# Patient Record
Sex: MALE | Race: OTHER | HISPANIC OR LATINO | URBAN - METROPOLITAN AREA
[De-identification: names, ages, dates, MRNs, and addresses within clinical notes are randomized per-mention and may not be internally consistent; named-entity substitution may affect disease eponyms.]

---

## 2017-10-19 ENCOUNTER — OUTPATIENT (OUTPATIENT)
Dept: OUTPATIENT SERVICES | Facility: HOSPITAL | Age: 27
LOS: 1 days | End: 2017-10-19
Payer: SELF-PAY

## 2017-10-19 ENCOUNTER — APPOINTMENT (OUTPATIENT)
Dept: FAMILY MEDICINE | Facility: HOSPITAL | Age: 27
End: 2017-10-19

## 2017-10-19 VITALS
RESPIRATION RATE: 14 BRPM | SYSTOLIC BLOOD PRESSURE: 120 MMHG | HEIGHT: 70 IN | DIASTOLIC BLOOD PRESSURE: 78 MMHG | BODY MASS INDEX: 31.21 KG/M2 | TEMPERATURE: 98 F | OXYGEN SATURATION: 99 % | HEART RATE: 65 BPM | WEIGHT: 218 LBS

## 2017-10-19 DIAGNOSIS — L21.8 OTHER SEBORRHEIC DERMATITIS: ICD-10-CM

## 2017-10-19 PROCEDURE — 80053 COMPREHEN METABOLIC PANEL: CPT

## 2017-10-19 PROCEDURE — 83036 HEMOGLOBIN GLYCOSYLATED A1C: CPT

## 2017-10-19 PROCEDURE — 80061 LIPID PANEL: CPT

## 2017-10-19 PROCEDURE — G0463: CPT

## 2017-10-19 PROCEDURE — 87389 HIV-1 AG W/HIV-1&-2 AB AG IA: CPT

## 2017-10-19 PROCEDURE — 85025 COMPLETE CBC W/AUTO DIFF WBC: CPT

## 2017-10-19 RX ORDER — HYDROCORTISONE 25 MG/G
2.5 OINTMENT TOPICAL TWICE DAILY
Qty: 1 | Refills: 0 | Status: ACTIVE | COMMUNITY
Start: 2017-10-19 | End: 1900-01-01

## 2018-06-26 ENCOUNTER — OUTPATIENT (OUTPATIENT)
Dept: OUTPATIENT SERVICES | Facility: HOSPITAL | Age: 28
LOS: 1 days | End: 2018-06-26
Payer: SELF-PAY

## 2018-06-26 ENCOUNTER — APPOINTMENT (OUTPATIENT)
Dept: FAMILY MEDICINE | Facility: HOSPITAL | Age: 28
End: 2018-06-26

## 2018-06-26 VITALS
WEIGHT: 217 LBS | TEMPERATURE: 98.9 F | DIASTOLIC BLOOD PRESSURE: 98 MMHG | SYSTOLIC BLOOD PRESSURE: 156 MMHG | BODY MASS INDEX: 31.14 KG/M2 | OXYGEN SATURATION: 98 % | HEART RATE: 76 BPM | RESPIRATION RATE: 14 BRPM

## 2018-06-26 DIAGNOSIS — Z00.00 ENCOUNTER FOR GENERAL ADULT MEDICAL EXAMINATION WITHOUT ABNORMAL FINDINGS: ICD-10-CM

## 2018-06-26 PROCEDURE — G0463: CPT

## 2018-06-26 RX ORDER — NAPROXEN 500 MG/1
500 TABLET ORAL
Qty: 14 | Refills: 0 | Status: ACTIVE | COMMUNITY
Start: 2018-06-26 | End: 1900-01-01

## 2018-06-26 NOTE — ASSESSMENT
[FreeTextEntry1] : #Sciatica, Left\par - Rest\par - Naproxen 500mg BID for 7 days\par - Stretching\par \par RTC in 1-2 weeks for CPE

## 2018-06-26 NOTE — PHYSICAL EXAM
[No Acute Distress] : no acute distress [Well Nourished] : well nourished [Well Developed] : well developed [Well-Appearing] : well-appearing [Normal Voice/Communication] : normal voice/communication [Normal Sclera/Conjunctiva] : normal sclera/conjunctiva [Normal Outer Ear/Nose] : the outer ears and nose were normal in appearance [No Respiratory Distress] : no respiratory distress  [Normal Rate] : normal rate  [Normal Gait] : normal gait [Deep Tendon Reflexes (DTR)] : deep tendon reflexes were 2+ and symmetric [de-identified] : point tenderness over the left upper outer gluteus.  Normal reflexes

## 2018-06-26 NOTE — HISTORY OF PRESENT ILLNESS
[FreeTextEntry8] : 29 YO M presenting with left sided gluteal/leg pain since wednesday. 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  Denies trauma to the left hip, back or leg.  Pain radiates down the back of left leg, comes and goes, ranging from 6/10 to 8/10.  Took 2 extra strength with no relief.  Denies numbness or tingling or weakness.

## 2018-06-27 DIAGNOSIS — M54.32 SCIATICA, LEFT SIDE: ICD-10-CM

## 2018-07-09 ENCOUNTER — OUTPATIENT (OUTPATIENT)
Dept: OUTPATIENT SERVICES | Facility: HOSPITAL | Age: 28
LOS: 1 days | End: 2018-07-09
Payer: SELF-PAY

## 2018-07-09 ENCOUNTER — APPOINTMENT (OUTPATIENT)
Dept: FAMILY MEDICINE | Facility: HOSPITAL | Age: 28
End: 2018-07-09

## 2018-07-09 VITALS
HEART RATE: 48 BPM | OXYGEN SATURATION: 99 % | RESPIRATION RATE: 14 BRPM | TEMPERATURE: 98.3 F | DIASTOLIC BLOOD PRESSURE: 88 MMHG | SYSTOLIC BLOOD PRESSURE: 128 MMHG | WEIGHT: 211 LBS | BODY MASS INDEX: 30.28 KG/M2

## 2018-07-09 DIAGNOSIS — M54.32 SCIATICA, LEFT SIDE: ICD-10-CM

## 2018-07-09 DIAGNOSIS — Z23 ENCOUNTER FOR IMMUNIZATION: ICD-10-CM

## 2018-07-09 DIAGNOSIS — E66.9 OBESITY, UNSPECIFIED: ICD-10-CM

## 2018-07-09 DIAGNOSIS — Z00.00 ENCOUNTER FOR GENERAL ADULT MEDICAL EXAMINATION WITHOUT ABNORMAL FINDINGS: ICD-10-CM

## 2018-07-09 DIAGNOSIS — Z00.00 ENCOUNTER FOR GENERAL ADULT MEDICAL EXAMINATION W/OUT ABNORMAL FINDINGS: ICD-10-CM

## 2018-07-09 PROCEDURE — G0463: CPT

## 2018-07-09 PROCEDURE — 36415 COLL VENOUS BLD VENIPUNCTURE: CPT

## 2018-07-09 PROCEDURE — 80061 LIPID PANEL: CPT

## 2018-07-09 PROCEDURE — 83036 HEMOGLOBIN GLYCOSYLATED A1C: CPT

## 2018-07-09 RX ORDER — ASPIRIN 81 MG
6.5 TABLET, DELAYED RELEASE (ENTERIC COATED) ORAL
Qty: 1 | Refills: 0 | Status: ACTIVE | COMMUNITY
Start: 2018-07-09 | End: 1900-01-01

## 2018-07-09 NOTE — PHYSICAL EXAM
[No Acute Distress] : no acute distress [Well Nourished] : well nourished [Well Developed] : well developed [Well-Appearing] : well-appearing [Normal Voice/Communication] : normal voice/communication [Normal Sclera/Conjunctiva] : normal sclera/conjunctiva [Normal Outer Ear/Nose] : the outer ears and nose were normal in appearance [No Respiratory Distress] : no respiratory distress  [Normal Rate] : normal rate  [Normal Gait] : normal gait [Deep Tendon Reflexes (DTR)] : deep tendon reflexes were 2+ and symmetric [de-identified] : point tenderness over the left upper outer gluteus.  Normal reflexes

## 2018-07-09 NOTE — HISTORY OF PRESENT ILLNESS
[de-identified] : 29 YO M with no significant PMHx presenting for CPE.  Reports left sided sciatica that has not improved with Naproxen alone.  Patient still working full time and gets the pain occasionally.  Denies any other complaints.  Denies fever, chills, nausea, vomiting, chest pain, abdominal pain, blood in stool or urine. Social alcohol use and occasional marijuana use.  Denies other drugs. Physically active, works in tree removal field.

## 2018-07-09 NOTE — ASSESSMENT
[FreeTextEntry1] : #Obesity\par - Lipids, A1C drawn in office\par \par #Right ear cerumen impaction\par - Debrox 2 times per day\par - RTC in 2 weeks for irrigation\par \par #Left sided Sciatica\par - Cont Naproxen \par - PT referral, 2 times per week for 4 weeks\par \par RTC in 2 weeks for right ear cerumen impaction

## 2018-07-10 DIAGNOSIS — H61.21 IMPACTED CERUMEN, RIGHT EAR: ICD-10-CM

## 2018-07-10 DIAGNOSIS — E66.9 OBESITY, UNSPECIFIED: ICD-10-CM

## 2018-07-11 ENCOUNTER — OUTPATIENT (OUTPATIENT)
Dept: OUTPATIENT SERVICES | Facility: HOSPITAL | Age: 28
LOS: 1 days | End: 2018-07-11

## 2018-07-11 DIAGNOSIS — M54.32 SCIATICA, LEFT SIDE: ICD-10-CM

## 2018-07-12 DIAGNOSIS — E66.9 OBESITY, UNSPECIFIED: ICD-10-CM

## 2018-07-12 DIAGNOSIS — H61.21 IMPACTED CERUMEN, RIGHT EAR: ICD-10-CM

## 2018-07-20 LAB
CHOLEST SERPL-MCNC: 216 MG/DL
CHOLEST/HDLC SERPL: 4.1 RATIO
HBA1C MFR BLD HPLC: 5.4 %
HDLC SERPL-MCNC: 53 MG/DL
LDLC SERPL CALC-MCNC: 125 MG/DL
TRIGL SERPL-MCNC: 191 MG/DL

## 2018-07-27 ENCOUNTER — APPOINTMENT (OUTPATIENT)
Dept: FAMILY MEDICINE | Facility: HOSPITAL | Age: 28
End: 2018-07-27

## 2018-07-27 ENCOUNTER — OUTPATIENT (OUTPATIENT)
Dept: OUTPATIENT SERVICES | Facility: HOSPITAL | Age: 28
LOS: 1 days | End: 2018-07-27
Payer: SELF-PAY

## 2018-07-27 VITALS
SYSTOLIC BLOOD PRESSURE: 130 MMHG | DIASTOLIC BLOOD PRESSURE: 80 MMHG | TEMPERATURE: 98.7 F | HEART RATE: 56 BPM | OXYGEN SATURATION: 97 % | BODY MASS INDEX: 29.41 KG/M2 | WEIGHT: 205 LBS | RESPIRATION RATE: 14 BRPM

## 2018-07-27 DIAGNOSIS — Z00.00 ENCOUNTER FOR GENERAL ADULT MEDICAL EXAMINATION WITHOUT ABNORMAL FINDINGS: ICD-10-CM

## 2018-07-27 DIAGNOSIS — H61.21 IMPACTED CERUMEN, RIGHT EAR: ICD-10-CM

## 2018-07-27 PROCEDURE — G0463: CPT

## 2018-07-27 NOTE — HISTORY OF PRESENT ILLNESS
[de-identified] : 27 YO M with no significant PMHx presenting for right ear irrigation.  Patient has been using debrox for the past 2 weeks.  Ear was irrigated in office with most of wax removed.  a little left.  Lab results from last visit reviewed with patient.  All questions answered.  Cholesterol slightly elevated patient educated on lifestyle changes.

## 2018-07-27 NOTE — PHYSICAL EXAM
[No Acute Distress] : no acute distress [Well Nourished] : well nourished [Well Developed] : well developed [Well-Appearing] : well-appearing [Normal Voice/Communication] : normal voice/communication [Normal Sclera/Conjunctiva] : normal sclera/conjunctiva [Normal Outer Ear/Nose] : the outer ears and nose were normal in appearance [No JVD] : no jugular venous distention [de-identified] : right ear cerumen impaction

## 2018-07-27 NOTE — ASSESSMENT
[FreeTextEntry1] : #Right ear cerumen impaction\par - Most wax removed\par - Continue Debrox for 1-2 more weeks\par \par RTC in 1-2 weeks for irrigation

## 2018-07-31 DIAGNOSIS — H61.21 IMPACTED CERUMEN, RIGHT EAR: ICD-10-CM

## 2018-08-06 PROCEDURE — 97140 MANUAL THERAPY 1/> REGIONS: CPT

## 2018-08-06 PROCEDURE — 97161 PT EVAL LOW COMPLEX 20 MIN: CPT

## 2018-08-06 PROCEDURE — 97035 APP MDLTY 1+ULTRASOUND EA 15: CPT

## 2018-08-06 PROCEDURE — 97110 THERAPEUTIC EXERCISES: CPT

## 2018-08-06 PROCEDURE — G0283: CPT

## 2018-08-08 ENCOUNTER — OUTPATIENT (OUTPATIENT)
Dept: OUTPATIENT SERVICES | Facility: HOSPITAL | Age: 28
LOS: 1 days | End: 2018-08-08
Payer: SELF-PAY

## 2018-08-08 ENCOUNTER — APPOINTMENT (OUTPATIENT)
Dept: FAMILY MEDICINE | Facility: HOSPITAL | Age: 28
End: 2018-08-08
Payer: SELF-PAY

## 2018-08-08 ENCOUNTER — FORM ENCOUNTER (OUTPATIENT)
Age: 28
End: 2018-08-08

## 2018-08-08 VITALS — SYSTOLIC BLOOD PRESSURE: 132 MMHG | DIASTOLIC BLOOD PRESSURE: 79 MMHG | HEART RATE: 61 BPM

## 2018-08-08 VITALS
HEIGHT: 71 IN | TEMPERATURE: 98.8 F | WEIGHT: 209 LBS | BODY MASS INDEX: 29.26 KG/M2 | HEART RATE: 56 BPM | RESPIRATION RATE: 16 BRPM | OXYGEN SATURATION: 98 % | SYSTOLIC BLOOD PRESSURE: 143 MMHG | DIASTOLIC BLOOD PRESSURE: 90 MMHG

## 2018-08-08 DIAGNOSIS — Z00.00 ENCOUNTER FOR GENERAL ADULT MEDICAL EXAMINATION WITHOUT ABNORMAL FINDINGS: ICD-10-CM

## 2018-08-08 RX ORDER — NORTRIPTYLINE HYDROCHLORIDE 25 MG/1
25 CAPSULE ORAL
Qty: 1 | Refills: 0 | Status: ACTIVE | COMMUNITY
Start: 2018-08-08 | End: 1900-01-01

## 2018-08-08 RX ORDER — CYCLOBENZAPRINE HYDROCHLORIDE 10 MG/1
10 TABLET, FILM COATED ORAL
Qty: 7 | Refills: 0 | Status: ACTIVE | COMMUNITY
Start: 2018-08-08 | End: 1900-01-01

## 2018-08-08 NOTE — ASSESSMENT
[FreeTextEntry1] : 29 yo M with PMHx of sciatic pain for over 1 month which is disabling, patient cannot work and it is contributing to his life stressors.

## 2018-08-08 NOTE — PHYSICAL EXAM
[No Acute Distress] : no acute distress [Well Nourished] : well nourished [PERRL] : pupils equal round and reactive to light [EOMI] : extraocular movements intact [Supple] : supple [No Lymphadenopathy] : no lymphadenopathy [No Respiratory Distress] : no respiratory distress  [Clear to Auscultation] : lungs were clear to auscultation bilaterally [Normal Rate] : normal rate  [Regular Rhythm] : with a regular rhythm [Normal S1, S2] : normal S1 and S2 [Soft] : abdomen soft [Non Tender] : non-tender [Non-distended] : non-distended [Normal Bowel Sounds] : normal bowel sounds [No Spinal Tenderness] : no spinal tenderness [No Joint Swelling] : no joint swelling [No Rash] : no rash [de-identified] : L straight leg raise +

## 2018-08-08 NOTE — COUNSELING
[Healthy eating counseling provided] : healthy eating [de-identified] : Recommended decrease carbohydrate, sugar and fatty food intake.

## 2018-08-08 NOTE — PLAN
[FreeTextEntry1] : -f/u  X ray lumbosacral \par -Started cyclobenzaprine 10 mg once a day\par -Started Nortriptyline 25 mg once a day \par -Discussed possible Physiatry referral if pain not well controlled, and pending imaging results \par - Follow up in 2 weeks. Instructed to return if pain is severe, and to go to ED if unable to ambulate. \par \par \par \par -Case discussed with Dr. Ross

## 2018-08-08 NOTE — HISTORY OF PRESENT ILLNESS
[FreeTextEntry1] : sciatic pain  [de-identified] : L sciatic pain started in June while at home one night. Starting at L gluteus and radiating down back of left leg. Pt states that pain is constantly present and intermittent. Previously came in two weeks ago and it was an 8/10. Currently pain is  a 3/10 but at its worst it goes up to 10/10, which occurred last night while sleeping on the right side. Pt was told to put pillow between his legs to reveal the pain, which does helped. Pain is worse with bending, standing up, at night and in the morning. He tried naproxen and PT (6 sessions) but both have not helped. Physical therapist noted that therapy is not working. 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 Denies urinary and fecal incontinence and paresthesias.\par \par Denies any ear pain and cerumen impaction. Occassional headaches and palpitations. \par \par Discussed PHQ-9 (score of 10) with patient and he denies feeling depressed and hopeless. Denies feeling of suicidal or homicidal ideation. Pt states that he feels like "crap" because of the sciatic pain he is having and being unable to complete all of his responsibilities. Family is in [facility] and patient is trying to support them but cant at this time. Patient would not like to speak to  just wants leg pain to get better. Misses playing soccer since sciatica started.\par \par Patient states he is trying to eat healthy, salad, beans and rice. He has lost 20 lbs, and has a good appetite.

## 2018-08-09 DIAGNOSIS — M54.32 SCIATICA, LEFT SIDE: ICD-10-CM

## 2018-08-09 PROCEDURE — G0463: CPT

## 2018-08-09 PROCEDURE — 72100 X-RAY EXAM L-S SPINE 2/3 VWS: CPT | Mod: 26

## 2018-08-09 PROCEDURE — 72100 X-RAY EXAM L-S SPINE 2/3 VWS: CPT

## 2018-08-29 ENCOUNTER — OUTPATIENT (OUTPATIENT)
Dept: OUTPATIENT SERVICES | Facility: HOSPITAL | Age: 28
LOS: 1 days | End: 2018-08-29
Payer: SELF-PAY

## 2018-08-29 ENCOUNTER — APPOINTMENT (OUTPATIENT)
Dept: FAMILY MEDICINE | Facility: HOSPITAL | Age: 28
End: 2018-08-29

## 2018-08-29 VITALS
TEMPERATURE: 98.4 F | BODY MASS INDEX: 29.57 KG/M2 | WEIGHT: 212 LBS | RESPIRATION RATE: 16 BRPM | SYSTOLIC BLOOD PRESSURE: 145 MMHG | OXYGEN SATURATION: 99 % | HEART RATE: 50 BPM | DIASTOLIC BLOOD PRESSURE: 80 MMHG

## 2018-08-29 VITALS — SYSTOLIC BLOOD PRESSURE: 138 MMHG | DIASTOLIC BLOOD PRESSURE: 89 MMHG

## 2018-08-29 DIAGNOSIS — Z00.00 ENCOUNTER FOR GENERAL ADULT MEDICAL EXAMINATION WITHOUT ABNORMAL FINDINGS: ICD-10-CM

## 2018-08-29 PROCEDURE — G0463: CPT

## 2018-08-29 RX ORDER — NORTRIPTYLINE HYDROCHLORIDE 25 MG/1
25 CAPSULE ORAL
Qty: 1 | Refills: 2 | Status: ACTIVE | COMMUNITY
Start: 2018-08-29 | End: 1900-01-01

## 2018-08-29 NOTE — PLAN
[FreeTextEntry1] : ~lower back pain with LLE pain and paresthesia- mildly improved, pt able to continue ADLs with restrictions \par -MRI referral\par -Continue nortryptilline 25 mg once a day\par -F/u appointment Saturday after MRI results return (pt prefers to come on Saturday as he cannot afford to miss a day of work).\par -Continue Tylenol PRN\par -BP was rechecked this visit since it was >140 initially. Second reading was 138/89. (SBP range 130s)\par -Counseled patient about diet and exercising. Recommended DASH diet. \par

## 2018-08-29 NOTE — COUNSELING
[Weight management counseling provided] : Weight management [Healthy eating counseling provided] : healthy eating [Activity counseling provided] : activity [Low Salt Diet] : Low salt diet [___ min/wk activity recommended] : [unfilled] min/wk activity recommended [Walking] : Walking

## 2018-08-29 NOTE — HISTORY OF PRESENT ILLNESS
[Other: _____] : [unfilled] [FreeTextEntry1] : low back pain and LLE pain  [de-identified] : Pt presents for follow up after XR lumbar spine 2 weeks ago (accompanied by GF). Still complains of lower back pain and L leg pain, which is currently 6/10. Pt states after standing for a while he develops sensation of tingling in his feet. 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 He states that pain is worse with bending over and he is unable to bend over to touch his toes. When sitting at work he develops pain in the L leg and L leg begins shaking or twitching. He also states that pressing behind his L knee causes pain to extend throughout his L leg from L gluteus to L foot. LLE pain improves with sitting, but pt reports that his leg feels like it's "falling asleep" and he has a tingling "creepy" sensation in LLE with walking. Pt denies any other paresthesias. He denies urinary incontinence and bowel incontinence. \par \par Today patient states he is able to bend his knee against the examination stool, which he could not do last time. He finished his course of cyclobenzaprine which he said makes him tired. He still is taking the nortryptiline. \par \par Pt mentioned girlfriend having symptoms of burning with urination (GF present). Discussed her coming back with patient for a visit in the near future.

## 2018-08-29 NOTE — ASSESSMENT
[FreeTextEntry1] : 29 yo M with PMHx of bradycardia, obesity, and low back pain and LLE pain and paresthesia for the past 3 months presents for follow up visit for lumbar XR results which were unremarkable. \par \par low back pain M 54.5 \par \par \par

## 2018-08-29 NOTE — PHYSICAL EXAM
[No Acute Distress] : no acute distress [Well Nourished] : well nourished [Well-Appearing] : well-appearing [PERRL] : pupils equal round and reactive to light [EOMI] : extraocular movements intact [Normal Outer Ear/Nose] : the outer ears and nose were normal in appearance [Supple] : supple [No Lymphadenopathy] : no lymphadenopathy [No Respiratory Distress] : no respiratory distress  [Clear to Auscultation] : lungs were clear to auscultation bilaterally [Normal Rate] : normal rate  [Regular Rhythm] : with a regular rhythm [Normal S1, S2] : normal S1 and S2 [No Murmur] : no murmur heard [Soft] : abdomen soft [Non Tender] : non-tender [Normal Bowel Sounds] : normal bowel sounds [Normal Gait] : normal gait [No Focal Deficits] : no focal deficits [Normal Supraclavicular Nodes] : no supraclavicular lymphadenopathy [de-identified] : no cerumen impaction noted  [de-identified] : lower back pain , + straight leg raise on L  [de-identified] : 5/5 strength b/l in UE and LE  [de-identified] : CN 2-12 intact ,

## 2018-09-05 DIAGNOSIS — M54.5 LOW BACK PAIN: ICD-10-CM

## 2018-09-24 ENCOUNTER — FORM ENCOUNTER (OUTPATIENT)
Age: 28
End: 2018-09-24

## 2018-09-25 ENCOUNTER — OUTPATIENT (OUTPATIENT)
Dept: OUTPATIENT SERVICES | Facility: HOSPITAL | Age: 28
LOS: 1 days | End: 2018-09-25
Payer: SELF-PAY

## 2018-09-25 ENCOUNTER — APPOINTMENT (OUTPATIENT)
Dept: MRI IMAGING | Facility: HOSPITAL | Age: 28
End: 2018-09-25

## 2018-09-25 DIAGNOSIS — Z00.8 ENCOUNTER FOR OTHER GENERAL EXAMINATION: ICD-10-CM

## 2018-09-25 PROCEDURE — 72148 MRI LUMBAR SPINE W/O DYE: CPT

## 2018-09-25 PROCEDURE — 72148 MRI LUMBAR SPINE W/O DYE: CPT | Mod: 26
